# Patient Record
Sex: MALE | Race: BLACK OR AFRICAN AMERICAN | NOT HISPANIC OR LATINO | Employment: UNEMPLOYED | URBAN - METROPOLITAN AREA
[De-identification: names, ages, dates, MRNs, and addresses within clinical notes are randomized per-mention and may not be internally consistent; named-entity substitution may affect disease eponyms.]

---
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
## 2022-01-16 ENCOUNTER — HOSPITAL ENCOUNTER (EMERGENCY)
Facility: OTHER | Age: 22
Discharge: HOME | End: 2022-01-16
Attending: EMERGENCY MEDICINE
Payer: MEDICAID

## 2022-01-16 VITALS
WEIGHT: 160 LBS | RESPIRATION RATE: 16 BRPM | SYSTOLIC BLOOD PRESSURE: 133 MMHG | HEIGHT: 72 IN | TEMPERATURE: 98 F | OXYGEN SATURATION: 98 % | DIASTOLIC BLOOD PRESSURE: 70 MMHG | BODY MASS INDEX: 21.67 KG/M2 | HEART RATE: 60 BPM

## 2022-01-16 DIAGNOSIS — H10.32 ACUTE CONJUNCTIVITIS OF LEFT EYE, UNSPECIFIED ACUTE CONJUNCTIVITIS TYPE: Primary | ICD-10-CM

## 2022-01-16 DIAGNOSIS — S00.83XA FACIAL CONTUSION, INITIAL ENCOUNTER: ICD-10-CM

## 2022-01-16 LAB
HCV AB SERPL QL IA: NEGATIVE
HIV 1+2 AB+HIV1 P24 AG SERPL QL IA: NEGATIVE

## 2022-01-16 PROCEDURE — 87389 HIV-1 AG W/HIV-1&-2 AB AG IA: CPT | Performed by: EMERGENCY MEDICINE

## 2022-01-16 PROCEDURE — 25000003 PHARM REV CODE 250: Performed by: EMERGENCY MEDICINE

## 2022-01-16 PROCEDURE — 99284 EMERGENCY DEPT VISIT MOD MDM: CPT | Mod: 25

## 2022-01-16 PROCEDURE — 86803 HEPATITIS C AB TEST: CPT | Performed by: EMERGENCY MEDICINE

## 2022-01-16 RX ORDER — CYCLOPENTOLATE HYDROCHLORIDE 10 MG/ML
2 SOLUTION/ DROPS OPHTHALMIC
Status: DISCONTINUED | OUTPATIENT
Start: 2022-01-16 | End: 2022-01-16

## 2022-01-16 RX ORDER — CYCLOPENTOLATE HYDROCHLORIDE 10 MG/ML
2 SOLUTION/ DROPS OPHTHALMIC EVERY 6 HOURS PRN
Qty: 15 ML | Refills: 0 | Status: SHIPPED | OUTPATIENT
Start: 2022-01-16 | End: 2022-01-16 | Stop reason: ALTCHOICE

## 2022-01-16 RX ORDER — NAPROXEN 500 MG/1
500 TABLET ORAL 2 TIMES DAILY PRN
Qty: 60 TABLET | Refills: 0 | Status: SHIPPED | OUTPATIENT
Start: 2022-01-16 | End: 2022-01-16 | Stop reason: SDUPTHER

## 2022-01-16 RX ORDER — ERYTHROMYCIN 5 MG/G
OINTMENT OPHTHALMIC 4 TIMES DAILY
Qty: 1 G | Refills: 0 | Status: SHIPPED | OUTPATIENT
Start: 2022-01-16

## 2022-01-16 RX ORDER — PROPARACAINE HYDROCHLORIDE 5 MG/ML
1 SOLUTION/ DROPS OPHTHALMIC
Status: COMPLETED | OUTPATIENT
Start: 2022-01-16 | End: 2022-01-16

## 2022-01-16 RX ORDER — NAPROXEN 500 MG/1
500 TABLET ORAL 2 TIMES DAILY PRN
Qty: 60 TABLET | Refills: 0 | Status: SHIPPED | OUTPATIENT
Start: 2022-01-16 | End: 2023-10-24

## 2022-01-16 RX ORDER — NAPROXEN 500 MG/1
500 TABLET ORAL
Status: COMPLETED | OUTPATIENT
Start: 2022-01-16 | End: 2022-01-16

## 2022-01-16 RX ORDER — ERYTHROMYCIN 5 MG/G
OINTMENT OPHTHALMIC
Status: COMPLETED | OUTPATIENT
Start: 2022-01-16 | End: 2022-01-16

## 2022-01-16 RX ADMIN — FLUORESCEIN SODIUM 1 EACH: 1 STRIP OPHTHALMIC at 12:01

## 2022-01-16 RX ADMIN — PROPARACAINE HYDROCHLORIDE 1 DROP: 5 SOLUTION/ DROPS OPHTHALMIC at 12:01

## 2022-01-16 RX ADMIN — NAPROXEN 500 MG: 500 TABLET ORAL at 12:01

## 2022-01-16 RX ADMIN — ERYTHROMYCIN 1 INCH: 5 OINTMENT OPHTHALMIC at 12:01
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
## 2022-01-16 NOTE — DISCHARGE INSTRUCTIONS
Call your primary care doctor to make the first available appointment.     Keep all your medical appointments.     Take your regular medication as prescribed. Contact your primary care provider before running out of medication, or for any problems obtaining them.    Do not drive or operate heavy machinery while taking opioid or muscle relaxing medications. Examples include norco, percocet, xanax, valium, flexeril.     Overuse or long term use of pain and sedating medication may lead to addiction, dependence, organ failure, family and work problems, legal problems, accidental overdose and death.    If you do not have health insurance, you probably can afford it:  Call [phone] ([facility] hotline) or go to www.ShopGo.la.gov    Your evaluation in the ED does not suggest any emergent or life threatening medical condition requiring admission or immediate intervention beyond that provided in the ED.   However, the signs of a serious problem sometimes take more time to appear.     RETURN TO THE ER if any of the following occur:    Weakness, dizziness, fainting, or loss of consciousness   Fever of 100.4ºF (38ºC) or higher  Any worse symptoms  Any new or concerning symptoms    To protect yourself and others from COVID19:  Wear a mask whenever indoors with people you do not live with.  Get vaccinated.   Anyone over 5 years old is eligible for vaccination.   Everyone 18 and older should get a 3rd dose (booster) of Pfizer or Moderna vaccine 6 months after second dose.  Everyone 18 and older should get another COVID vaccine dose 2 months after first [employer] dose.     Vaccination is shown to prevent getting sick, ending up in the hospital, or dying because of COVID19.   After COVID19 vaccination, quarantine for 5 days if exposed to someone with COVID19.   After your first COVID19 vaccination, you can register to get $100 ([email or web address])  If you are vaccinated, help friends and family get the vaccine.    If not  vaccinated:  There is a vaccination waiting for you. To get it:   Text your ZIP code to GETVAX ([id number]) or VACUNA ([id number]) in [place] citizen  call 311, or [phone], or [phone], or [phone], go to www.vaccines.gov, or  Call your health provider  Wear a mask whenever indoors with people you do not live with.  Stay at least 6 feet from others you do not live with,  If exposed to someone with cold, flu, or COVID19 symptoms, you must quarantine for 5 days.   Even if you have no symptoms   Otherwise you could give the virus to someone who dies from it  Some symptoms of COVID19 include fever, cough, sore throat, breathing troubles, loss of taste/smell, headaches, stomach upset, diarrhea.
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
## 2022-05-03 ENCOUNTER — LAB VISIT (OUTPATIENT)
Dept: PRIMARY CARE CLINIC | Facility: OTHER | Age: 22
End: 2022-05-03
Attending: INTERNAL MEDICINE
Payer: MEDICAID

## 2022-05-03 DIAGNOSIS — Z20.822 ENCOUNTER FOR LABORATORY TESTING FOR COVID-19 VIRUS: ICD-10-CM

## 2022-05-03 PROCEDURE — U0003 INFECTIOUS AGENT DETECTION BY NUCLEIC ACID (DNA OR RNA); SEVERE ACUTE RESPIRATORY SYNDROME CORONAVIRUS 2 (SARS-COV-2) (CORONAVIRUS DISEASE [COVID-19]), AMPLIFIED PROBE TECHNIQUE, MAKING USE OF HIGH THROUGHPUT TECHNOLOGIES AS DESCRIBED BY CMS-2020-01-R: HCPCS | Performed by: INTERNAL MEDICINE

## 2022-05-04 LAB
SARS-COV-2 RNA RESP QL NAA+PROBE: NOT DETECTED
SARS-COV-2- CYCLE NUMBER: NORMAL

## 2022-06-07 ENCOUNTER — LAB VISIT (OUTPATIENT)
Dept: PRIMARY CARE CLINIC | Facility: OTHER | Age: 22
End: 2022-06-07
Attending: INTERNAL MEDICINE
Payer: MEDICAID

## 2022-06-07 DIAGNOSIS — Z20.822 ENCOUNTER FOR LABORATORY TESTING FOR COVID-19 VIRUS: ICD-10-CM

## 2022-06-07 PROCEDURE — U0003 INFECTIOUS AGENT DETECTION BY NUCLEIC ACID (DNA OR RNA); SEVERE ACUTE RESPIRATORY SYNDROME CORONAVIRUS 2 (SARS-COV-2) (CORONAVIRUS DISEASE [COVID-19]), AMPLIFIED PROBE TECHNIQUE, MAKING USE OF HIGH THROUGHPUT TECHNOLOGIES AS DESCRIBED BY CMS-2020-01-R: HCPCS | Performed by: INTERNAL MEDICINE

## 2022-06-08 LAB
SARS-COV-2 RNA RESP QL NAA+PROBE: NOT DETECTED
SARS-COV-2- CYCLE NUMBER: NORMAL

## 2023-11-01 PROBLEM — S02.609B OPEN FRACTURE OF MANDIBLE: Status: ACTIVE | Noted: 2023-11-01

## 2023-11-02 PROBLEM — S02.622D: Status: ACTIVE | Noted: 2023-11-01

## 2023-11-02 PROBLEM — S02.600B OPEN FRACTURE OF BODY OF MANDIBLE: Status: ACTIVE | Noted: 2023-11-02

## 2023-11-02 PROBLEM — S02.601D: Status: ACTIVE | Noted: 2023-11-02

## 2024-01-14 ENCOUNTER — HOSPITAL ENCOUNTER (EMERGENCY)
Facility: OTHER | Age: 24
Discharge: HOME | End: 2024-01-14
Attending: EMERGENCY MEDICINE
Payer: MEDICAID

## 2024-01-14 VITALS
OXYGEN SATURATION: 100 % | SYSTOLIC BLOOD PRESSURE: 122 MMHG | RESPIRATION RATE: 18 BRPM | HEART RATE: 90 BPM | DIASTOLIC BLOOD PRESSURE: 66 MMHG | TEMPERATURE: 98 F

## 2024-01-14 DIAGNOSIS — F12.90 MARIJUANA USE: Primary | ICD-10-CM

## 2024-01-14 PROCEDURE — 99283 EMERGENCY DEPT VISIT LOW MDM: CPT

## 2024-01-14 NOTE — ED PROVIDER NOTES
Encounter Date: 1/14/2024    SCRIBE #1 NOTE: I, Pratima Zakia, am scribing for, and in the presence of,  Alfa Turner II, MD. I have scribed the following portions of the note - Other sections scribed: HPI, PE.       History     Chief Complaint   Patient presents with    marijuana use     Pt BIB EMS after being found by [employer] smoking marijuana. Per EMS, pt was given choice by [employer] to go to detention or hospital. Pt chose hospital. Denies complaints.      Patient is a 23 y.o. male presenting to the ED via EMS after he was caught by [employer] smoking marijuana and given a choice between going to detention and going to the hospital. Pt chose to come to the hospital, but denies being hurt or sick. Pt denies smoking anything other than marijuana. Pt states that he takes prescription Adderrall and sleep medication. He has a history of hypertension. Per EMS, his blood pressure was 130/70. Pt states that he wants to call his mom to come pick him up. This is the extent of the patient's complaints at this time.        The history is provided by the patient, medical records and the EMS personnel. No  was used.     Review of patient's allergies indicates:  No Known Allergies  Past Medical History:   Diagnosis Date    ADHD (attention deficit hyperactivity disorder)     Hypertension      Past Surgical History:   Procedure Laterality Date    ORIF MANDIBULAR FRACTURE Bilateral 11/2/2023    Procedure: ORIF right parasymphysis, closed vs. open reduction left subcondylar fracture;  Surgeon: Germán Navarro DDS;  Location: [facility] MAIN OR;  Service: Oral Surgery;  Laterality: Bilateral;     No family history on file.  Social History     Tobacco Use    Smoking status: Never     Passive exposure: Yes    Smokeless tobacco: Never   Substance Use Topics    Alcohol use: No    Drug use: No     Review of Systems  See HPI    Physical Exam     Initial Vitals [01/14/24 1551]   BP Pulse Resp Temp SpO2   122/66 93 16 98.4 °F (36.9 °C) 100  %      MAP       --         Physical Exam    Nursing note and vitals reviewed.  Constitutional: He appears well-developed and well-nourished. He is not diaphoretic. No distress.   HENT:   Head: Normocephalic and atraumatic.   Eyes: Conjunctivae and EOM are normal. Pupils are equal, round, and reactive to light.   Neck: Neck supple.   Normal range of motion.  Cardiovascular:  Normal rate, regular rhythm and intact distal pulses.     Exam reveals no gallop and no friction rub.       No murmur heard.  Pulmonary/Chest: Breath sounds normal. No respiratory distress. He has no wheezes. He has no rhonchi. He has no rales.   Musculoskeletal:         General: No tenderness or edema. Normal range of motion.      Cervical back: Normal range of motion and neck supple.     Neurological: He is alert and oriented to person, place, and time.   Skin: Skin is warm and dry.   Psychiatric: He has a normal mood and affect. His behavior is normal. Judgment and thought content normal.   Speech is slowed but accurate. Not confused.         ED Course   Procedures  Labs Reviewed - No data to display       Imaging Results    None          Medications - No data to display  Medical Decision Making    Patient presents by EMS apparently after being found by in [employer]  smoking marijuana.  Patient is requesting to go home  almost immediately upon arrival.  He has no current complaints.  Exam is unremarkable.  Vital signs are stable.  He will call his mom to come pick him up.  Stable for discharge        Scribe Attestation:   Scribe #1: I performed the above scribed service and the documentation accurately describes the services I performed. I attest to the accuracy of the note.         Physician Attestation for Scribe: I,  TL, reviewed documentation as scribed in my presence, which is both accurate and complete.                        Clinical Impression:  Final diagnoses:  [F12.90] Marijuana use (Primary)          ED Disposition Condition     Discharge Stable          ED Prescriptions    None       Follow-up Information       Follow up With Specialties Details Why Contact Info    Alissa Ibrahim MD Pediatrics In 1 week  [address] [facility] [address]  [phone]               Alfa Turner II, MD  01/14/24 8428

## 2024-04-09 ENCOUNTER — HOSPITAL ENCOUNTER (EMERGENCY)
Facility: OTHER | Age: 24
Discharge: HOME | End: 2024-04-10
Attending: EMERGENCY MEDICINE
Payer: MEDICAID

## 2024-04-09 VITALS
TEMPERATURE: 99 F | BODY MASS INDEX: 29.29 KG/M2 | OXYGEN SATURATION: 98 % | WEIGHT: 187 LBS | HEART RATE: 81 BPM | RESPIRATION RATE: 17 BRPM | DIASTOLIC BLOOD PRESSURE: 62 MMHG | SYSTOLIC BLOOD PRESSURE: 127 MMHG

## 2024-04-09 DIAGNOSIS — R05.9 COUGH, UNSPECIFIED TYPE: ICD-10-CM

## 2024-04-09 DIAGNOSIS — R07.9 CHEST PAIN: ICD-10-CM

## 2024-04-09 DIAGNOSIS — R09.81 NASAL CONGESTION: ICD-10-CM

## 2024-04-09 DIAGNOSIS — J06.9 VIRAL URI WITH COUGH: Primary | ICD-10-CM

## 2024-04-09 DIAGNOSIS — J06.9 UPPER RESPIRATORY TRACT INFECTION, UNSPECIFIED TYPE: ICD-10-CM

## 2024-04-09 LAB
CTP QC/QA: YES
CTP QC/QA: YES
POC MOLECULAR INFLUENZA A AGN: NEGATIVE
POC MOLECULAR INFLUENZA B AGN: NEGATIVE
SARS-COV-2 RDRP RESP QL NAA+PROBE: NEGATIVE

## 2024-04-09 PROCEDURE — 93010 ELECTROCARDIOGRAM REPORT: CPT | Mod: ,,, | Performed by: INTERNAL MEDICINE

## 2024-04-09 PROCEDURE — 93005 ELECTROCARDIOGRAM TRACING: CPT

## 2024-04-09 PROCEDURE — 99284 EMERGENCY DEPT VISIT MOD MDM: CPT | Mod: 25

## 2024-04-09 PROCEDURE — 25000003 PHARM REV CODE 250: Performed by: EMERGENCY MEDICINE

## 2024-04-09 PROCEDURE — 87635 SARS-COV-2 COVID-19 AMP PRB: CPT | Performed by: EMERGENCY MEDICINE

## 2024-04-09 PROCEDURE — 25000242 PHARM REV CODE 250 ALT 637 W/ HCPCS: Performed by: EMERGENCY MEDICINE

## 2024-04-09 RX ORDER — FLUTICASONE PROPIONATE 50 MCG
2 SPRAY, SUSPENSION (ML) NASAL 2 TIMES DAILY PRN
Qty: 16 G | Refills: 0 | Status: SHIPPED | OUTPATIENT
Start: 2024-04-09

## 2024-04-09 RX ORDER — DIPHENHYDRAMINE HCL 25 MG
50 CAPSULE ORAL
Status: COMPLETED | OUTPATIENT
Start: 2024-04-09 | End: 2024-04-09

## 2024-04-09 RX ORDER — PROMETHAZINE HYDROCHLORIDE AND CODEINE PHOSPHATE 6.25; 1 MG/5ML; MG/5ML
10 SOLUTION ORAL
Status: DISCONTINUED | OUTPATIENT
Start: 2024-04-09 | End: 2024-04-10 | Stop reason: HOSPADM

## 2024-04-09 RX ORDER — GUAIFENESIN AND DEXTROMETHORPHAN HYDROBROMIDE 10; 100 MG/5ML; MG/5ML
10 SYRUP ORAL 4 TIMES DAILY PRN
Qty: 120 ML | Refills: 0 | Status: SHIPPED | OUTPATIENT
Start: 2024-04-09 | End: 2024-04-19

## 2024-04-09 RX ORDER — ALBUTEROL SULFATE 90 UG/1
4 AEROSOL, METERED RESPIRATORY (INHALATION)
Status: COMPLETED | OUTPATIENT
Start: 2024-04-09 | End: 2024-04-09

## 2024-04-09 RX ORDER — ACETAMINOPHEN 500 MG
1000 TABLET ORAL
Status: COMPLETED | OUTPATIENT
Start: 2024-04-09 | End: 2024-04-09

## 2024-04-09 RX ORDER — IBUPROFEN 400 MG/1
800 TABLET ORAL
Status: COMPLETED | OUTPATIENT
Start: 2024-04-09 | End: 2024-04-09

## 2024-04-09 RX ORDER — IBUPROFEN 600 MG/1
600 TABLET ORAL EVERY 6 HOURS PRN
Qty: 20 TABLET | Refills: 0 | Status: SHIPPED | OUTPATIENT
Start: 2024-04-09

## 2024-04-09 RX ORDER — ALBUTEROL SULFATE 90 UG/1
2 AEROSOL, METERED RESPIRATORY (INHALATION) EVERY 4 HOURS PRN
Qty: 18 G | Refills: 0 | Status: SHIPPED | OUTPATIENT
Start: 2024-04-09

## 2024-04-09 RX ADMIN — ACETAMINOPHEN 1000 MG: 500 TABLET ORAL at 11:04

## 2024-04-09 RX ADMIN — ALBUTEROL SULFATE 4 PUFF: 90 AEROSOL, METERED RESPIRATORY (INHALATION) at 11:04

## 2024-04-09 RX ADMIN — DIPHENHYDRAMINE HYDROCHLORIDE 50 MG: 25 CAPSULE ORAL at 11:04

## 2024-04-09 RX ADMIN — IBUPROFEN 800 MG: 400 TABLET ORAL at 11:04

## 2024-04-10 LAB
OHS QRS DURATION: 84 MS
OHS QTC CALCULATION: 352 MS

## 2024-04-10 NOTE — ED PROVIDER NOTES
Encounter Date: 4/9/2024       History     Chief Complaint   Patient presents with    Cough     Cough, chest pain & headaches. Took tylenol. Pt also had abdominal pain earlier today.      23-year-old male presents via significant other to [facility] ER with cough, rhinorrhea, nasal congestion, chest pain, abdominal pain (resolved), subjective fevers and chills, and sweats.  Patient was in his usual state of health until yesterday Monday 04/08/2024.  Symptoms worsened today.  Patient reports cough productive of some yellow and clear mucus.  He reports anterior burning chest pain primarily on coughing.  Chest pain was most severe around 9:45 p.m., about an hour prior to my assessment.  At that time, patient also had periumbilical pain, though this has resolved.  No nausea or vomiting.  No diarrhea.  Patient took Mucinex DM liquid around 6:00 p.m..  Significant other (here) has similar symptoms (started Sunday 04/07/2024) and was told by physician she had a URI.      Patient has HTN and takes Enalapril.  He previously was on Adderall for ADHD but has not been on it for some time due to prior incarceration.      Review of patient's allergies indicates:  No Known Allergies  Past Medical History:   Diagnosis Date    ADHD (attention deficit hyperactivity disorder)     Hypertension      Past Surgical History:   Procedure Laterality Date    ORIF MANDIBULAR FRACTURE Bilateral 11/2/2023    Procedure: ORIF right parasymphysis, closed vs. open reduction left subcondylar fracture;  Surgeon: Germán Navarro DDS;  Location: [facility] MAIN OR;  Service: Oral Surgery;  Laterality: Bilateral;     No family history on file.  Social History     Tobacco Use    Smoking status: Never     Passive exposure: Yes    Smokeless tobacco: Never   Substance Use Topics    Alcohol use: No    Drug use: No     Review of Systems   Constitutional:  Positive for chills, diaphoresis and fever.   HENT:  Positive for rhinorrhea.    Eyes:  Negative for  pain.   Respiratory:  Positive for cough.    Cardiovascular:  Positive for chest pain.   Gastrointestinal:  Positive for abdominal pain. Negative for nausea and vomiting.   Genitourinary:  Negative for flank pain.   Musculoskeletal:  Negative for gait problem.   Skin:  Negative for rash.   Neurological:  Negative for syncope and light-headedness.       Physical Exam     Initial Vitals [04/09/24 2216]   BP Pulse Resp Temp SpO2   127/62 73 18 99 °F (37.2 °C) 98 %      MAP       --         Physical Exam    Nursing note and vitals reviewed.  Constitutional: He appears well-developed and well-nourished. He is not diaphoretic.   Awake, alert, nontoxic.   HENT:   Head: Normocephalic and atraumatic.   Mouth/Throat: Oropharynx is clear and moist.   Nasal phonation. Tonsils large bilat but without exudate. TMs within normal bilat.   Eyes: Conjunctivae and EOM are normal. Pupils are equal, round, and reactive to light.   Neck: Neck supple.   Normal range of motion.  Cardiovascular:  Normal rate, regular rhythm and intact distal pulses.           Pulmonary/Chest: No respiratory distress. He has wheezes (only on coughing). He has no rhonchi. He has no rales.   Coughing frequently   Abdominal: Abdomen is soft. There is no abdominal tenderness.   Musculoskeletal:         General: No tenderness or edema. Normal range of motion.      Cervical back: Normal range of motion and neck supple.     Neurological: He is alert and oriented to person, place, and time.   Moving all extremities   Skin: Skin is warm and dry.   Psychiatric: He has a normal mood and affect.         ED Course   Procedures  Labs Reviewed   SARS-COV-2 RDRP GENE   POCT INFLUENZA A/B MOLECULAR     EKG Readings: (Independently Interpreted)   23:32: Sinus bradycardia, HR 54. Normal axis. Normal intervals. No ectopy. No STEMI.       Imaging Results              X-Ray Chest AP Portable (Final result)  Result time 04/09/24 23:27:35      Final result by Constanza Chowdhury,  MD (04/09/24 23:27:35)                   Impression:      No acute intrathoracic abnormality detected.      Electronically signed by: Constanza Chowdhury  Date:    04/09/2024  Time:    23:27               Narrative:    EXAMINATION:  AP PORTABLE CHEST    CLINICAL HISTORY:  Chest pain, unspecified    TECHNIQUE:  AP portable chest radiograph was submitted.    COMPARISON:  None.    FINDINGS:  AP portable chest radiograph demonstrates a cardiac silhouette within normal limits.  There is no focal consolidation, pneumothorax, or pleural effusion.                                       Medications   diphenhydrAMINE capsule 50 mg (50 mg Oral Given 4/9/24 2343)   ibuprofen tablet 800 mg (800 mg Oral Given 4/9/24 2343)   albuterol inhaler 4 puff (4 puffs Inhalation Given 4/9/24 2345)   acetaminophen tablet 1,000 mg (1,000 mg Oral Given 4/9/24 2343)     Medical Decision Making  24 yo male with cough, rhinorrhea, nasal congestion, chest pain, abdominal pain (resolved), subjective fevers and chills, and sweats.    Ddx includes influenza, COVID-19, other viral URI, ACS, PNA, other.     EKG no STEMI. Normal morphology.    CXR NAD.    Flu and COVID-19 negative.      Patient received PO APAP 1g, PO ibuprofen 800mg, PO benadryl 50mg, and albuterol MDI 4 puffs while in ER.    I have rx'ed PRN albuterol, flonase, ibuprofen, and dextromethorphan-guaifenesin.    D/c'ed as viral URI, VSS.      Amount and/or Complexity of Data Reviewed  Radiology: ordered.    Risk  OTC drugs.  Prescription drug management.                                      Clinical Impression:  Final diagnoses:  [R07.9] Chest pain  [J06.9] Viral URI with cough (Primary)  [R05.9] Cough, unspecified type  [J06.9] Upper respiratory tract infection, unspecified type  [R09.81] Nasal congestion          ED Disposition Condition    Discharge Stable          ED Prescriptions       Medication Sig Dispense Start Date End Date Auth. Provider    albuterol (PROVENTIL/VENTOLIN HFA) 90  mcg/actuation inhaler Inhale 2 puffs into the lungs every 4 (four) hours as needed for Wheezing or Shortness of Breath (cough). Rescue 18 g 4/9/2024 -- Connie Arias MD    fluticasone propionate (FLONASE) 50 mcg/actuation nasal spray 2 sprays (100 mcg total) by Each Nostril route 2 (two) times daily as needed for Rhinitis or Allergies. 15 g 4/9/2024 -- Connie Arias MD    ibuprofen (ADVIL,MOTRIN) 600 MG tablet Take 1 tablet (600 mg total) by mouth every 6 (six) hours as needed for Pain. 20 tablet 4/9/2024 -- Connie Arias MD    dextromethorphan-guaiFENesin  mg/5 ml (ROBITUSSIN-DM)  mg/5 mL liquid Take 10 mLs by mouth 4 (four) times daily as needed (cough). 120 mL 4/9/2024 4/19/2024 Connie Arias MD          Follow-up Information    None          Conine Arias MD  04/10/24 8426

## 2024-04-16 ENCOUNTER — OFFICE VISIT (OUTPATIENT)
Dept: PRIMARY CARE CLINIC | Facility: CLINIC | Age: 24
End: 2024-04-16
Payer: MEDICAID

## 2024-04-16 VITALS
WEIGHT: 154.88 LBS | SYSTOLIC BLOOD PRESSURE: 121 MMHG | HEART RATE: 65 BPM | DIASTOLIC BLOOD PRESSURE: 64 MMHG | HEIGHT: 61 IN | TEMPERATURE: 98 F | RESPIRATION RATE: 16 BRPM | BODY MASS INDEX: 29.24 KG/M2 | OXYGEN SATURATION: 100 %

## 2024-04-16 DIAGNOSIS — I10 PRIMARY HYPERTENSION: ICD-10-CM

## 2024-04-16 DIAGNOSIS — J06.9 VIRAL URI: Primary | ICD-10-CM

## 2024-04-16 PROCEDURE — 99999 PR PBB SHADOW E&M-EST. PATIENT-LVL III: CPT | Mod: PBBFAC,,, | Performed by: STUDENT IN AN ORGANIZED HEALTH CARE EDUCATION/TRAINING PROGRAM

## 2024-04-16 PROCEDURE — 99204 OFFICE O/P NEW MOD 45 MIN: CPT | Mod: S$PBB,,, | Performed by: STUDENT IN AN ORGANIZED HEALTH CARE EDUCATION/TRAINING PROGRAM

## 2024-04-16 PROCEDURE — 3008F BODY MASS INDEX DOCD: CPT | Mod: CPTII,,, | Performed by: STUDENT IN AN ORGANIZED HEALTH CARE EDUCATION/TRAINING PROGRAM

## 2024-04-16 PROCEDURE — 3078F DIAST BP <80 MM HG: CPT | Mod: CPTII,,, | Performed by: STUDENT IN AN ORGANIZED HEALTH CARE EDUCATION/TRAINING PROGRAM

## 2024-04-16 PROCEDURE — 99213 OFFICE O/P EST LOW 20 MIN: CPT | Mod: PBBFAC,PN | Performed by: STUDENT IN AN ORGANIZED HEALTH CARE EDUCATION/TRAINING PROGRAM

## 2024-04-16 PROCEDURE — 1159F MED LIST DOCD IN RCRD: CPT | Mod: CPTII,,, | Performed by: STUDENT IN AN ORGANIZED HEALTH CARE EDUCATION/TRAINING PROGRAM

## 2024-04-16 PROCEDURE — 3074F SYST BP LT 130 MM HG: CPT | Mod: CPTII,,, | Performed by: STUDENT IN AN ORGANIZED HEALTH CARE EDUCATION/TRAINING PROGRAM

## 2024-04-16 RX ORDER — PROMETHAZINE HYDROCHLORIDE AND DEXTROMETHORPHAN HYDROBROMIDE 6.25; 15 MG/5ML; MG/5ML
5 SYRUP ORAL EVERY 8 HOURS PRN
Qty: 240 ML | Refills: 0 | Status: SHIPPED | OUTPATIENT
Start: 2024-04-16 | End: 2024-04-26

## 2024-04-16 RX ORDER — METHYLPREDNISOLONE 4 MG/1
TABLET ORAL
Qty: 21 EACH | Refills: 0 | Status: SHIPPED | OUTPATIENT
Start: 2024-04-16 | End: 2024-05-07

## 2024-04-16 NOTE — PROGRESS NOTES
04/17/2024    [place]  [id number]    Chief Complaint   Patient presents with    [facility]  This pt is known to me and presents for acute concern. Chronic conditions: HTN, ADHD    Cough that started earlier this month. Caught from someone else. Has tried taking mucinex. Cough is staying the same      Negative 10 point ROS outside of HPI    Social History     Socioeconomic History    Marital status: Single   Tobacco Use    Smoking status: Every Day     Current packs/day: 0.50     Types: Cigarettes     Passive exposure: Yes    Smokeless tobacco: Never   Substance and Sexual Activity    Alcohol use: No    Drug use: No    Sexual activity: Yes           Current Outpatient Medications:     albuterol (PROVENTIL/VENTOLIN HFA) 90 mcg/actuation inhaler, Inhale 2 puffs into the lungs every 4 (four) hours as needed for Wheezing or Shortness of Breath (cough). Rescue (Patient not taking: Reported on 4/16/2024), Disp: 18 g, Rfl: 0    dextromethorphan-guaiFENesin  mg/5 ml (ROBITUSSIN-DM)  mg/5 mL liquid, Take 10 mLs by mouth 4 (four) times daily as needed (cough). (Patient not taking: Reported on 4/16/2024), Disp: 120 mL, Rfl: 0    fluticasone propionate (FLONASE) 50 mcg/actuation nasal spray, spray 2 sprays (100 mcg total) by Each Nostril route 2 (two) times daily as needed for Rhinitis or Allergies. (Patient not taking: Reported on 4/16/2024), Disp: 16 g, Rfl: 0    ibuprofen (ADVIL,MOTRIN) 600 MG tablet, Take 1 tablet (600 mg total) by mouth every 6 (six) hours as needed for Pain. (Patient not taking: Reported on 4/16/2024), Disp: 20 tablet, Rfl: 0    methylPREDNISolone (MEDROL DOSEPACK) 4 mg tablet, use as directed, Disp: 21 each, Rfl: 0    promethazine-dextromethorphan (PROMETHAZINE-DM) 6.25-15 mg/5 mL Syrp, Take 5 mLs by mouth every 8 (eight) hours as needed (cough)., Disp: 240 mL, Rfl: 0    propranolol (INDERAL) 10 MG tablet, Take 2.5 mg by mouth 3 (three) times daily. (Patient not taking: Reported  on 4/16/2024), Disp: , Rfl:       Physical Exam  Vitals:    04/16/24 1545   BP: 121/64   Pulse: 65   Resp: 16   Temp: 97.6 °F (36.4 °C)       Physical Exam      Gen: well appearing, NAD  Resp: non labored breathing, no crackles, no wheezes, CTAB  CV: RRR no murmur, gallops, rubs, no LE edema  Abd: soft nontender BS present no organomegaly      1. Viral URI  - methylPREDNISolone (MEDROL DOSEPACK) 4 mg tablet; use as directed  Dispense: 21 each; Refill: 0  - promethazine-dextromethorphan (PROMETHAZINE-DM) 6.25-15 mg/5 mL Syrp; Take 5 mLs by mouth every 8 (eight) hours as needed (cough).  Dispense: 240 mL; Refill: 0    2. Primary hypertension  Well controlled continue current regimen        RTC if worsening or no improvement in 1-2 weeks      Yumiko La MD  Family Medicine

## 2025-05-13 ENCOUNTER — HOSPITAL ENCOUNTER (EMERGENCY)
Facility: OTHER | Age: 25
Discharge: HOME | End: 2025-05-13
Attending: STUDENT IN AN ORGANIZED HEALTH CARE EDUCATION/TRAINING PROGRAM
Payer: MEDICAID

## 2025-05-13 VITALS
RESPIRATION RATE: 18 BRPM | OXYGEN SATURATION: 100 % | DIASTOLIC BLOOD PRESSURE: 66 MMHG | SYSTOLIC BLOOD PRESSURE: 125 MMHG | TEMPERATURE: 98 F | WEIGHT: 162 LBS | BODY MASS INDEX: 21.94 KG/M2 | HEART RATE: 75 BPM | HEIGHT: 72 IN

## 2025-05-13 DIAGNOSIS — M79.671 FOOT PAIN, RIGHT: Primary | ICD-10-CM

## 2025-05-13 DIAGNOSIS — S99.921A RIGHT FOOT INJURY: ICD-10-CM

## 2025-05-13 LAB
HCV AB SERPL QL IA: NEGATIVE
HIV 1+2 AB+HIV1 P24 AG SERPL QL IA: NEGATIVE
HOLD SPECIMEN: NORMAL

## 2025-05-13 PROCEDURE — 99284 EMERGENCY DEPT VISIT MOD MDM: CPT | Mod: 25

## 2025-05-13 PROCEDURE — 87389 HIV-1 AG W/HIV-1&-2 AB AG IA: CPT | Performed by: STUDENT IN AN ORGANIZED HEALTH CARE EDUCATION/TRAINING PROGRAM

## 2025-05-13 PROCEDURE — 86803 HEPATITIS C AB TEST: CPT | Performed by: STUDENT IN AN ORGANIZED HEALTH CARE EDUCATION/TRAINING PROGRAM

## 2025-05-13 PROCEDURE — 25000003 PHARM REV CODE 250: Performed by: STUDENT IN AN ORGANIZED HEALTH CARE EDUCATION/TRAINING PROGRAM

## 2025-05-13 RX ORDER — NAPROXEN 250 MG/1
250 TABLET ORAL 2 TIMES DAILY WITH MEALS
Qty: 20 TABLET | Refills: 0 | Status: SHIPPED | OUTPATIENT
Start: 2025-05-13

## 2025-05-13 RX ORDER — NAPROXEN 500 MG/1
500 TABLET ORAL
Status: COMPLETED | OUTPATIENT
Start: 2025-05-13 | End: 2025-05-13

## 2025-05-13 RX ADMIN — NAPROXEN 500 MG: 500 TABLET ORAL at 09:05

## 2025-05-13 NOTE — ED NOTES
Pt ambulated down the roth to use the bathroom and maintained a steady gait. Pt moved slightly slower d/t R foot pain
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
## 2025-05-13 NOTE — DISCHARGE INSTRUCTIONS
Naproxen has been sent to your pharmacy. You can supplement with tylenol 650 mg every 6-8 hours as well. After the naproxen is done, You can take Tylenol 500-650 mg every 8 hours, and ibuprofen 600-800 mg every 8 hours; this means you can take pain medicine once every 4 hours.